# Patient Record
Sex: MALE | Race: WHITE | Employment: FULL TIME | URBAN - NONMETROPOLITAN AREA
[De-identification: names, ages, dates, MRNs, and addresses within clinical notes are randomized per-mention and may not be internally consistent; named-entity substitution may affect disease eponyms.]

---

## 2017-10-09 ENCOUNTER — TELEPHONE (OUTPATIENT)
Dept: FAMILY MEDICINE CLINIC | Age: 42
End: 2017-10-09

## 2017-10-09 NOTE — TELEPHONE ENCOUNTER
Patient returned scheduling call, declined 10/10/17 with Dr Charlotte Corona due work, appointment scheduled for 10/19/17 with Marlon Almeida, he declined sooner morning appointments due his work schedule.

## 2017-10-09 NOTE — TELEPHONE ENCOUNTER
Attempted to contact the pt to schedule the appt Dr Radha Tiwari mentioned & got VM. A msg was left notifying the pt to return our call if he is still needing the appt.

## 2017-10-09 NOTE — TELEPHONE ENCOUNTER
Allison Wallace is having lower abdominal pain, left testicle pain and low back pain. He would be a new patient and next new patient opening is not until November.

## 2017-10-18 ENCOUNTER — TELEPHONE (OUTPATIENT)
Dept: FAMILY MEDICINE CLINIC | Age: 42
End: 2017-10-18

## 2018-12-07 ENCOUNTER — HOSPITAL ENCOUNTER (EMERGENCY)
Age: 43
Discharge: HOME | End: 2018-12-07
Payer: COMMERCIAL

## 2018-12-07 VITALS
DIASTOLIC BLOOD PRESSURE: 64 MMHG | HEIGHT: 66 IN | HEART RATE: 77 BPM | SYSTOLIC BLOOD PRESSURE: 111 MMHG | BODY MASS INDEX: 25.31 KG/M2 | RESPIRATION RATE: 18 BRPM | WEIGHT: 157.5 LBS | TEMPERATURE: 98.1 F | OXYGEN SATURATION: 98 %

## 2018-12-07 DIAGNOSIS — K08.89 PAIN, DENTAL: ICD-10-CM

## 2018-12-07 DIAGNOSIS — K02.9 DENTAL DECAY: Primary | ICD-10-CM

## 2018-12-07 PROCEDURE — 99202 OFFICE O/P NEW SF 15 MIN: CPT

## 2018-12-07 PROCEDURE — 99203 OFFICE O/P NEW LOW 30 MIN: CPT | Performed by: NURSE PRACTITIONER

## 2018-12-07 RX ORDER — AMOXICILLIN 500 MG/1
500 CAPSULE ORAL 3 TIMES DAILY
Qty: 30 CAPSULE | Refills: 0 | Status: SHIPPED | OUTPATIENT
Start: 2018-12-07 | End: 2018-12-17

## 2018-12-07 RX ORDER — IBUPROFEN 800 MG/1
800 TABLET ORAL EVERY 8 HOURS PRN
Qty: 30 TABLET | Refills: 0 | Status: SHIPPED | OUTPATIENT
Start: 2018-12-07 | End: 2020-02-07

## 2018-12-07 RX ORDER — IBUPROFEN 200 MG
200 TABLET ORAL EVERY 6 HOURS PRN
COMMUNITY
End: 2018-12-07 | Stop reason: ALTCHOICE

## 2018-12-07 ASSESSMENT — PAIN DESCRIPTION - LOCATION: LOCATION: TEETH

## 2018-12-07 ASSESSMENT — PAIN SCALES - GENERAL: PAINLEVEL_OUTOF10: 5

## 2018-12-07 ASSESSMENT — PAIN DESCRIPTION - ORIENTATION: ORIENTATION: RIGHT;UPPER

## 2018-12-07 ASSESSMENT — PAIN DESCRIPTION - PAIN TYPE: TYPE: ACUTE PAIN

## 2018-12-07 ASSESSMENT — PAIN DESCRIPTION - FREQUENCY: FREQUENCY: CONTINUOUS

## 2018-12-07 ASSESSMENT — PAIN DESCRIPTION - DESCRIPTORS: DESCRIPTORS: ACHING;THROBBING

## 2018-12-13 ASSESSMENT — ENCOUNTER SYMPTOMS
CHEST TIGHTNESS: 0
WHEEZING: 0
SORE THROAT: 0
RHINORRHEA: 0
SINUS PRESSURE: 0
STRIDOR: 0
COUGH: 0
SHORTNESS OF BREATH: 0
VOICE CHANGE: 0
TROUBLE SWALLOWING: 0
FACIAL SWELLING: 0

## 2019-11-26 ENCOUNTER — HOSPITAL ENCOUNTER (EMERGENCY)
Age: 44
Discharge: HOME | End: 2019-11-26
Payer: COMMERCIAL

## 2019-11-26 ENCOUNTER — TELEPHONE (OUTPATIENT)
Dept: FAMILY MEDICINE CLINIC | Age: 44
End: 2019-11-26

## 2019-11-26 VITALS
DIASTOLIC BLOOD PRESSURE: 81 MMHG | RESPIRATION RATE: 14 BRPM | HEART RATE: 82 BPM | SYSTOLIC BLOOD PRESSURE: 120 MMHG | BODY MASS INDEX: 25.39 KG/M2 | OXYGEN SATURATION: 96 % | HEIGHT: 66 IN | WEIGHT: 158 LBS | TEMPERATURE: 98.2 F

## 2019-11-26 DIAGNOSIS — B02.39: Primary | ICD-10-CM

## 2019-11-26 PROCEDURE — 99202 OFFICE O/P NEW SF 15 MIN: CPT

## 2019-11-26 PROCEDURE — 99203 OFFICE O/P NEW LOW 30 MIN: CPT | Performed by: NURSE PRACTITIONER

## 2019-11-26 RX ORDER — IBUPROFEN 800 MG/1
800 TABLET ORAL EVERY 6 HOURS PRN
Qty: 30 TABLET | Refills: 1 | Status: SHIPPED | OUTPATIENT
Start: 2019-11-26 | End: 2020-01-10 | Stop reason: ALTCHOICE

## 2019-11-26 RX ORDER — ACYCLOVIR 800 MG/1
800 TABLET ORAL
Qty: 35 TABLET | Refills: 0 | Status: SHIPPED | OUTPATIENT
Start: 2019-11-26 | End: 2019-12-03

## 2019-11-26 ASSESSMENT — ENCOUNTER SYMPTOMS
RHINORRHEA: 0
FACIAL SWELLING: 0
ALLERGIC/IMMUNOLOGIC NEGATIVE: 1
EYE DISCHARGE: 0
TROUBLE SWALLOWING: 0
COUGH: 0
VOICE CHANGE: 0
VOMITING: 0
SORE THROAT: 0
SHORTNESS OF BREATH: 0
EYE PAIN: 0
PHOTOPHOBIA: 0
EYE ITCHING: 1
EYE REDNESS: 0
COLOR CHANGE: 0
NAUSEA: 0
ABDOMINAL PAIN: 0

## 2019-11-26 ASSESSMENT — PAIN DESCRIPTION - FREQUENCY: FREQUENCY: CONTINUOUS

## 2019-11-26 ASSESSMENT — PAIN DESCRIPTION - DESCRIPTORS: DESCRIPTORS: BURNING;ITCHING

## 2019-11-26 ASSESSMENT — PAIN DESCRIPTION - ORIENTATION: ORIENTATION: LEFT

## 2019-11-26 ASSESSMENT — PAIN DESCRIPTION - LOCATION: LOCATION: FACE;HEAD

## 2019-11-26 ASSESSMENT — PAIN DESCRIPTION - PAIN TYPE: TYPE: ACUTE PAIN

## 2019-11-26 ASSESSMENT — PAIN SCALES - GENERAL: PAINLEVEL_OUTOF10: 3

## 2020-01-09 ENCOUNTER — TELEPHONE (OUTPATIENT)
Dept: FAMILY MEDICINE CLINIC | Age: 45
End: 2020-01-09

## 2020-01-10 ENCOUNTER — OFFICE VISIT (OUTPATIENT)
Dept: FAMILY MEDICINE CLINIC | Age: 45
End: 2020-01-10
Payer: COMMERCIAL

## 2020-01-10 ENCOUNTER — NURSE ONLY (OUTPATIENT)
Dept: LAB | Age: 45
End: 2020-01-10

## 2020-01-10 VITALS
RESPIRATION RATE: 10 BRPM | BODY MASS INDEX: 26.99 KG/M2 | SYSTOLIC BLOOD PRESSURE: 128 MMHG | HEART RATE: 88 BPM | WEIGHT: 162 LBS | DIASTOLIC BLOOD PRESSURE: 74 MMHG | TEMPERATURE: 97.7 F | HEIGHT: 65 IN

## 2020-01-10 PROBLEM — R10.13 DYSPEPSIA: Status: ACTIVE | Noted: 2020-01-10

## 2020-01-10 PROBLEM — R19.4 BOWEL HABIT CHANGES: Status: ACTIVE | Noted: 2020-01-10

## 2020-01-10 LAB
ALBUMIN SERPL-MCNC: 4.7 G/DL (ref 3.5–5.1)
ALP BLD-CCNC: 51 U/L (ref 38–126)
ALT SERPL-CCNC: 18 U/L (ref 11–66)
AMYLASE: 73 U/L (ref 20–104)
ANION GAP SERPL CALCULATED.3IONS-SCNC: 13 MEQ/L (ref 8–16)
AST SERPL-CCNC: 17 U/L (ref 5–40)
BASOPHILS # BLD: 0.4 %
BASOPHILS ABSOLUTE: 0 THOU/MM3 (ref 0–0.1)
BILIRUB SERPL-MCNC: 0.4 MG/DL (ref 0.3–1.2)
BUN BLDV-MCNC: 12 MG/DL (ref 7–22)
CALCIUM SERPL-MCNC: 9.8 MG/DL (ref 8.5–10.5)
CHLORIDE BLD-SCNC: 102 MEQ/L (ref 98–111)
CHOLESTEROL, TOTAL: 97 MG/DL (ref 100–199)
CO2: 24 MEQ/L (ref 23–33)
CREAT SERPL-MCNC: 0.9 MG/DL (ref 0.4–1.2)
EOSINOPHIL # BLD: 1.1 %
EOSINOPHILS ABSOLUTE: 0.1 THOU/MM3 (ref 0–0.4)
ERYTHROCYTE [DISTWIDTH] IN BLOOD BY AUTOMATED COUNT: 12.6 % (ref 11.5–14.5)
ERYTHROCYTE [DISTWIDTH] IN BLOOD BY AUTOMATED COUNT: 43.9 FL (ref 35–45)
GFR SERPL CREATININE-BSD FRML MDRD: > 90 ML/MIN/1.73M2
GLUCOSE BLD-MCNC: 97 MG/DL (ref 70–108)
HCT VFR BLD CALC: 46.2 % (ref 42–52)
HDLC SERPL-MCNC: 62 MG/DL
HEMOGLOBIN: 15.1 GM/DL (ref 14–18)
IMMATURE GRANS (ABS): 0.02 THOU/MM3 (ref 0–0.07)
IMMATURE GRANULOCYTES: 0.2 %
LDL CHOLESTEROL CALCULATED: 27 MG/DL
LIPASE: 40.6 U/L (ref 5.6–51.3)
LYMPHOCYTES # BLD: 26.6 %
LYMPHOCYTES ABSOLUTE: 2.3 THOU/MM3 (ref 1–4.8)
MCH RBC QN AUTO: 30.9 PG (ref 26–33)
MCHC RBC AUTO-ENTMCNC: 32.7 GM/DL (ref 32.2–35.5)
MCV RBC AUTO: 94.7 FL (ref 80–94)
MONOCYTES # BLD: 7.6 %
MONOCYTES ABSOLUTE: 0.6 THOU/MM3 (ref 0.4–1.3)
NUCLEATED RED BLOOD CELLS: 0 /100 WBC
PLATELET # BLD: 197 THOU/MM3 (ref 130–400)
PMV BLD AUTO: 11.5 FL (ref 9.4–12.4)
POTASSIUM SERPL-SCNC: 4.4 MEQ/L (ref 3.5–5.2)
RBC # BLD: 4.88 MILL/MM3 (ref 4.7–6.1)
SEG NEUTROPHILS: 64.1 %
SEGMENTED NEUTROPHILS ABSOLUTE COUNT: 5.4 THOU/MM3 (ref 1.8–7.7)
SODIUM BLD-SCNC: 139 MEQ/L (ref 135–145)
TOTAL PROTEIN: 7.1 G/DL (ref 6.1–8)
TRIGL SERPL-MCNC: 42 MG/DL (ref 0–199)
TSH SERPL DL<=0.05 MIU/L-ACNC: 1.2 UIU/ML (ref 0.4–4.2)
WBC # BLD: 8.5 THOU/MM3 (ref 4.8–10.8)

## 2020-01-10 PROCEDURE — 99204 OFFICE O/P NEW MOD 45 MIN: CPT | Performed by: FAMILY MEDICINE

## 2020-01-10 RX ORDER — VARENICLINE TARTRATE 1 MG/1
1 TABLET, FILM COATED ORAL 2 TIMES DAILY
Qty: 60 TABLET | Refills: 1 | Status: SHIPPED | OUTPATIENT
Start: 2020-01-10 | End: 2021-06-16

## 2020-01-10 RX ORDER — VARENICLINE TARTRATE 25 MG
KIT ORAL
Qty: 1 BOX | Refills: 0 | Status: SHIPPED | OUTPATIENT
Start: 2020-01-10 | End: 2021-06-16

## 2020-01-10 RX ORDER — OMEPRAZOLE 20 MG/1
20 CAPSULE, DELAYED RELEASE ORAL
Qty: 60 CAPSULE | Refills: 5 | Status: SHIPPED | OUTPATIENT
Start: 2020-01-10

## 2020-01-10 ASSESSMENT — PATIENT HEALTH QUESTIONNAIRE - PHQ9
1. LITTLE INTEREST OR PLEASURE IN DOING THINGS: 0
2. FEELING DOWN, DEPRESSED OR HOPELESS: 0
SUM OF ALL RESPONSES TO PHQ QUESTIONS 1-9: 0
SUM OF ALL RESPONSES TO PHQ9 QUESTIONS 1 & 2: 0
SUM OF ALL RESPONSES TO PHQ QUESTIONS 1-9: 0

## 2020-01-10 NOTE — PATIENT INSTRUCTIONS
eating habits. ? It's best to eat several small meals instead of two or three large meals. ? After you eat, wait 2 to 3 hours before you lie down. ? Chocolate, mint, and alcohol can make GERD worse. ? Spicy foods, foods that have a lot of acid (like tomatoes and oranges), and coffee can make GERD symptoms worse in some people. If your symptoms are worse after you eat a certain food, you may want to stop eating that food to see if your symptoms get better. · Do not smoke or chew tobacco. Smoking can make GERD worse. If you need help quitting, talk to your doctor about stop-smoking programs and medicines. These can increase your chances of quitting for good. · If you have GERD symptoms at night, raise the head of your bed 6 to 8 inches. You can do this by putting the frame on blocks or placing a foam wedge under the head of your mattress. (Adding extra pillows does not work.)  · Do not wear tight clothing around your middle. · Lose weight if you need to. Losing just 5 to 10 pounds can help. · Do not take anti-inflammatory medicines, such as aspirin, ibuprofen (Advil, Motrin), or naproxen (Aleve). These can irritate the stomach. If you need a pain medicine, try acetaminophen (Tylenol), which does not cause stomach upset. When should you call for help? Call your doctor now or seek immediate medical care if:    · You have new or worse belly pain.     · You are vomiting.    Watch closely for changes in your health, and be sure to contact your doctor if:    · You have new or worse symptoms of indigestion.     · You have trouble or pain swallowing.     · You are losing weight.     · You do not get better as expected. Where can you learn more? Go to https://[employer]ss.[employer]. org and sign in to your [employer] account. Enter U299 in the [employer] box to learn more about \"Indigestion (Dyspepsia or Heartburn): Care Instructions. \"     If you do not have an account, please click on the \"Sign Up and call your doctor if you are having problems. It's also a good idea to know your test results and keep a list of the medicines you take. How can you care for yourself at home? · Ask your family, friends, and coworkers for support. You have a better chance of quitting if you have help and support. · Join a support group, such as Nicotine Anonymous, for people who are trying to quit smoking. · Consider signing up for a smoking cessation program, such as the American Lung Association's Freedom from Smoking program.  · Get text messaging support. Go to the website at www.smokefree. gov to sign up for the [facility] program.  · Set a quit date. Pick your date carefully so that it is not right in the middle of a big deadline or stressful time. Once you quit, do not even take a puff. Get rid of all ashtrays and lighters after your last cigarette. Clean your house and your clothes so that they do not smell of smoke. · Learn how to be a nonsmoker. Think about ways you can avoid those things that make you reach for a cigarette. ? Avoid situations that put you at greatest risk for smoking. For some people, it is hard to have a drink with friends without smoking. For others, they might skip a coffee break with coworkers who smoke. ? Change your daily routine. Take a different route to work or eat a meal in a different place. · Cut down on stress. Calm yourself or release tension by doing an activity you enjoy, such as reading a book, taking a hot bath, or gardening. · Talk to your doctor or pharmacist about nicotine replacement therapy, which replaces the nicotine in your body. You still get nicotine but you do not use tobacco. Nicotine replacement products help you slowly reduce the amount of nicotine you need. These products come in several forms, many of them available over-the-counter:  ? Nicotine patches  ? Nicotine gum and lozenges  ?  Nicotine inhaler  · Ask your doctor about bupropion (Wellbutrin) or

## 2020-01-13 ENCOUNTER — TELEPHONE (OUTPATIENT)
Dept: FAMILY MEDICINE CLINIC | Age: 45
End: 2020-01-13

## 2020-01-13 NOTE — TELEPHONE ENCOUNTER
----- Message from Mare Samuels DO sent at 1/12/2020  9:58 AM EST -----  Please let pt know that blood work looked great. Stool testing pending yet, will call with results once available  Lipids are excellent  No diabetes. Thyroid fxn wNL  Let me know if questions, thanks!

## 2020-01-17 LAB — HELICOBACTER PYLORI ANTIGEN, STOOL: NORMAL

## 2020-01-20 ENCOUNTER — TELEPHONE (OUTPATIENT)
Dept: FAMILY MEDICINE CLINIC | Age: 45
End: 2020-01-20

## 2020-01-20 NOTE — TELEPHONE ENCOUNTER
----- Message from Ronnie Mays DO sent at 1/19/2020  9:37 AM EST -----  Please let pt know that stool test was unable to be run  Recommend we redo this. Let me know if questions, thanks!

## 2020-01-29 LAB — HELICOBACTER PYLORI ANTIGEN, STOOL: NEGATIVE

## 2020-01-30 ENCOUNTER — TELEPHONE (OUTPATIENT)
Dept: FAMILY MEDICINE CLINIC | Age: 45
End: 2020-01-30

## 2020-01-30 NOTE — TELEPHONE ENCOUNTER
Sheron Waldrop DO  P [facility]             Please let pt know that stool study neg for stomach infection. Also, there are 2 charts with the same pt, they need merged, please get with medical records for this, thanks! Associated Results     Result Notes for H. Pylori Antigen, Stool     Notes recorded by Leticia Salazar CMA ([address] [place] [address]) on 1/30/2020 at 8:45 AM EST  SEE TEL ENC 01.30.2020  ------    Notes recorded by Sheron Waldrop DO on 1/29/2020 at 6:36 PM EST  Please let pt know that stool study neg for stomach infection. Also, there are 2 charts with the same pt, they need merged, please get with medical records for this, thanks! H. Pylori Antigen, Stool   Order: [id number]   Status:  Edited Result - FINAL   Visible to patient:  No (Not Released)    Ref Range & Units 3d ago   HELICOBACTER PYLORI ANTIGEN, STOOL NEGATIVE NEGATIVE    Comment:                                                                             Test performed at [address] [place] [address].                     [address] [facility] [address], [address] [facility] [address] Number [id number]     CAP Accreditation Number [id number]       ----------------------------------------------------------------------   Pathology [facility] [facility], [address] [facility] [id number]   CAP Accreditation No. [id number]   : Vern De Santiago M.D.     Resulting Agency  [facility]      Narrative   Performed by: [facility]   Ordering Provider: Del Joya      Specimen Collected: 01/27/20 07:30 Last Resulted: 01/29/20 15:34         Lab Flowsheet       Order Details       View Encounter       Lab and Collection Details       Routing       Result History

## 2020-01-30 NOTE — TELEPHONE ENCOUNTER
Medical records states they are going to try and merge the records together today. Will let us know if they have any issues.

## 2020-02-07 ENCOUNTER — TELEPHONE (OUTPATIENT)
Dept: FAMILY MEDICINE CLINIC | Age: 45
End: 2020-02-07

## 2020-02-07 ENCOUNTER — OFFICE VISIT (OUTPATIENT)
Dept: FAMILY MEDICINE CLINIC | Age: 45
End: 2020-02-07
Payer: COMMERCIAL

## 2020-02-07 VITALS
BODY MASS INDEX: 27.49 KG/M2 | TEMPERATURE: 97.5 F | RESPIRATION RATE: 12 BRPM | HEART RATE: 80 BPM | HEIGHT: 65 IN | SYSTOLIC BLOOD PRESSURE: 108 MMHG | DIASTOLIC BLOOD PRESSURE: 62 MMHG | WEIGHT: 165 LBS

## 2020-02-07 PROBLEM — R19.4 BOWEL HABIT CHANGES: Status: RESOLVED | Noted: 2020-01-10 | Resolved: 2020-02-07

## 2020-02-07 PROCEDURE — 99214 OFFICE O/P EST MOD 30 MIN: CPT | Performed by: FAMILY MEDICINE

## 2020-02-07 NOTE — PATIENT INSTRUCTIONS
Patient Education        Indigestion (Dyspepsia or Heartburn): Care Instructions  Your Care Instructions  Sometimes it can be hard to pinpoint the cause of indigestion. (It is also called dyspepsia or heartburn.) Most cases of an upset stomach with bloating, burning, burping, and nausea are minor and go away within several hours. Home treatment and over-the-counter medicine often are able to control symptoms. But if you take medicine to relieve your indigestion without making diet and lifestyle changes, your symptoms are likely to return again and again. If you get indigestion often, it may be a sign of a more serious medical problem. Be sure to follow up with your doctor, who may want to do tests to be sure of the cause of your indigestion. Follow-up care is a key part of your treatment and safety. Be sure to make and go to all appointments, and call your doctor if you are having problems. It's also a good idea to know your test results and keep a list of the medicines you take. How can you care for yourself at home? · Your doctor may recommend over-the-counter medicine. For mild or occasional indigestion, antacids such as Gaviscon, Mylanta, Maalox, or Tums, may help. Be safe with medicines. Be careful when you take over-the-counter antacid medicines. Many of these medicines have aspirin in them. Read the label to make sure that you are not taking more than the recommended dose. Too much aspirin can be harmful. · Your doctor also may recommend over-the-counter acid reducers, such as Pepcid AC, Tagamet HB, Zantac 75, or Prilosec. Read and follow all instructions on the label. If you use these medicines often, talk with your doctor. · Change your eating habits. ? It's best to eat several small meals instead of two or three large meals. ? After you eat, wait 2 to 3 hours before you lie down. ? Chocolate, mint, and alcohol can make GERD worse. ?  Spicy foods, foods that have a lot of acid (like tomatoes and professional. [address] any warranty or liability for your use of this information. Ambulatory

## 2020-02-07 NOTE — PROGRESS NOTES
Chief Complaint   Patient presents with    Follow-up     issues as noted below       History obtained from the patient. SUBJECTIVE:  Jonathan Sharpe is a 38 y.o. male that presents today for     -Epigastric pain LAST VISIT:  Going on the last year  Mild, but constant  occ heartburn, depending on what he eats. No dyshagia  No melena or hematochezia  No wt loss  Not tried anything OTC  Typically does not take NSAIDS  Does state bowel movement shape has changed some the last year  MGF with colon cancer, ~age 55  Denies excessive ETOH use    UPDATE TODAY:   Doing much better  On omeprazole bid  Saw [facility]  Had EGD/colo Monday, pt states had some gastritis and a small colon polyp  Is to have colo in 5 years  No f/u with GI planned  Has made diet changes  No dysphagia  Has cut back on ETOH use. -ETOH:  Admitted to some more ETOH use than he told me last visit, but has cut back sig  1 to 2 drinks per day if that  Was doing a few 20 oz beer per day prior      -Smoking LAST VISIT:  PPD x 30 years  Tried various times to quit  Nothing stuck  Has tried wellbutrin and cold turkey    UPDATE TODAY:   On chantix x 2 wks  Down to 1/2 PPD  Doing well  No side effects    Age/Gender Health Maintenance    Lipid -   Lab Results   Component Value Date    CHOL 97 (L) 01/10/2020     Lab Results   Component Value Date    TRIG 42 01/10/2020     Lab Results   Component Value Date    HDL 62 01/10/2020     Lab Results   Component Value Date    LDLCALC 27 01/10/2020     No results found for: LABVLDL, VLDL  No results found for: CHOLHDLRATIO    DM Screen -   Lab Results   Component Value Date    GLUCOSE 97 01/10/2020       Colon Cancer Screening - Colon polyp (Records pending) with [facility], FEB 2020, repeat 5 years.    Lung Cancer Screening (Age 54 to [de-identified] with 30 pack year hx, current smoker or quit within past 13 years) - age 53    Tetanus - discuss next visit  Influenza Vaccine - declines JAN 2020  Pneumonia Vaccine - declines FEB 2020  Zoster - age 47     PSA testing discussion - age 53  AAA Screening - age 71    Falls screening - n/a      Current Outpatient Medications   Medication Sig Dispense Refill    varenicline (CHANTIX CONTINUING MONTH PAK) 1 MG tablet Take 1 tablet by mouth 2 times daily 60 tablet 1    varenicline (CHANTIX STARTING MONTH PAK) 0.5 MG X 11 & 1 MG X 42 tablet Take by mouth as directed on box 1 box 0    omeprazole (PRILOSEC) 20 MG delayed release capsule Take 1 capsule by mouth 2 times daily (before meals) 60 capsule 5     No current facility-administered medications for this visit. No orders of the defined types were placed in this encounter. All medications reviewed and reconciled, including OTC and herbal medications. Updated list given to patient. Patient Active Problem List    Diagnosis Date Noted    Dyspepsia 01/10/2020    Family history of colon cancer, Maternal grandfather at ~ age 54     History of shingles     Nicotine dependence        Past Medical History:   Diagnosis Date    Family history of colon cancer, Maternal grandfather at ~ age 54     Hernia, inguinal     History of shingles     Kidney stone     Nicotine dependence        Past Surgical History:   Procedure Laterality Date    ADENOIDECTOMY      HERNIA REPAIR      KIDNEY STONE SURGERY      LITHOTRIPSY      MYRINGOTOMY AND TYMPANOSTOMY TUBE PLACEMENT      VASECTOMY N/A 09/28/2007       No Known Allergies      Social History     Tobacco Use    Smoking status: Current Every Day Smoker     Packs/day: 0.50     Years: 30.00     Pack years: 15.00     Types: Cigarettes     Start date: 1/10/1990    Smokeless tobacco: Never Used    Tobacco comment: cutting down with Chantix   Substance Use Topics    Alcohol use:  Yes     Alcohol/week: 21.0 standard drinks     Types: 21 Cans of beer per week     Comment: 2-3 beers /day         Family History   Problem Relation Age of Onset    Colon Cancer Maternal Grandfather 54    Prostate Cancer Paternal Uncle 61    Prostate Cancer Paternal Uncle 61    Heart Attack Father          I have reviewed the patient's past medical history, past surgical history, allergies, medications, social and family history and I have made updates where appropriate. Review of Systems  Positive responses are highlighted in bold    Constitutional:  Fever, Chills, Night Sweats, Fatigue, Unexpected changes in weight  HENT:  Ear pain, Tinnitus, Nosebleeds, Trouble swallowing, Hearing loss, Sore throat  Cardiovascular:  Chest Pain, Palpitations, Orthopnea, Paroxysmal Nocturnal Dyspnea  Respiratory:  Cough, Wheezing, Shortness of breath, Chest tightness, Apnea  Gastrointestinal:  Nausea, Vomiting, Diarrhea, Constipation, Heartburn, Blood in stool  Genitourinary:  Difficulty or painful urination, Flank pain, Change in frequency, Urgency  Skin:  Color change, Rash, Itching, Wound  Musculoskeletal:  Joint pain, Back pain, Gait problems, Joint swelling, Myalgias  Neurological:  Dizziness, Headaches, Presyncope, Numbness, Seizures, Tremors  Endocrine:  Heat Intolerance, Cold Intolerance, Polydipsia, Polyphagia, Polyuria      PHYSICAL EXAM:  Vitals:    02/07/20 0822   BP: 108/62   Pulse: 80   Resp: 12   Temp: 97.5 °F (36.4 °C)   TempSrc: Oral   Weight: 165 lb (74.8 kg)   Height: 5' 5\" (1.651 m)     Body mass index is 27.46 kg/m².   Pain Score:   0 - No pain    VS Reviewed  General Appearance: A&O x 3, No acute distress,well developed and well- nourished  Eyes: pupils equal, round, and reactive to light, extraocular eye movements intact, conjunctivae and eye lids without erythema  ENT: external ear and ear canal clear bilaterally, TMs intact and regular, nose without deformity, nasal mucosa and turbinates normal without polyps, oropharynx normal, dentition is normal for age  Neck: supple and non-tender without mass, no thyromegaly or thyroid nodules, no cervical lymphadenopathy  Pulmonary/Chest: clear to auscultation bilaterally- no wheezes, rales or rhonchi, normal air movement, no respiratory distress or retractions  Cardiovascular: S1 and S2 auscultated w/ RRR. No murmurs, rubs, clicks, or gallops, distal pulses intact. Abdomen: soft, non-tender, non-distended, bowel sounds physiologic,  no rebound or guarding, no masses or hernias noted. Liver and spleen without enlargement. Extremities: no cyanosis, clubbing or edema of the lower extremities. +2 PT/DP bilaterally. Musculoskeletal: No joint swelling or gross deformity   Skin: warm and dry, no rash or erythema      Orders Only on 01/27/2020   Component Date Value Ref Range Status    HELICOBACTER PYLORI ANTIGEN, STOOL 01/27/2020 NEGATIVE  NEGATIVE Final    Comment:                                                                             Test performed at [address] [place] [address].                    [address] [place] [address]                     CLIA Number [id number]     CAP Accreditation Number [id number]       ----------------------------------------------------------------------  Pathology [address] [place] [address], [address] [place] [address]  CLIA No. [id number]   CAP Accreditation No. [id number]  : Arely Adams. Shweta Mak M.D.      Nurse Only on 01/10/2020   Component Date Value Ref Range Status    HELICOBACTER PYLORI ANTIGEN, STOOL 01/10/2020 SEE BELOW   Final    Comment: Specimen Description           FECES  Direct Exam                 REJECTED  Direct Exam                SEE BELOW  SPECIMEN BEYOND STABILITY RANGE  Direct Exam                 [facility] [address] [facility], [address] [place] [address] [phone]  Report Status              SEE BELOW  FINAL 01/17/2020      Lipase 01/10/2020 40.6  5.6 - 51.3 U/L Final    Performed at [address] [place] [address], [facility]    Amylase 01/10/2020 73  20 - 104 U/L Final    Performed at [address] [place] [address], [facility]    TSH 01/10/2020 1.200  0.400 - 4.20 uIU/mL Final    Performed at [facility], [address]    Cholesterol, Total 01/10/2020 97* 100 - 199 mg/dL Final    Comment:      <200          Desirable       200 - 239     Borderline High       >239          High      Triglycerides 01/10/2020 42  0 - 199 mg/dL Final    Comment:      <150          Desirable       150 - 199     Borderline High       200 - 499     High       >449          Very High       Ranges are based upon NCEP/ATP III guidelines.  HDL 01/10/2020 62  mg/dL Final    Comment:      Refer to General Chemistry for CHOL and TRIG results. HDL CLASSIFICATIONS FOR PATIENTS > 21YEARS OLD. <40               Undesirable (Major Risk Factor)       >60               Protective (Negative Risk Factor)      LDL Calculated 01/10/2020 27  mg/dL Final    Comment:      Refer to General Chemistry for CHOL and TRIG results.        LDL CLASSIFICATIONS FOR PATIENTS >21YEARS OLD:          ** Determination Invalid if TRIG >400 **       <100          Optimal       100 - 129     Near or Above Optimal       130 - 159     Borderline High       160 - 189     High Risk       >189          Very High Risk  Performed at [facility], [address]      Glucose 01/10/2020 97  70 - 108 mg/dL Final    CREATININE 01/10/2020 0.9  0.4 - 1.2 mg/dL Final    BUN 01/10/2020 12  7 - 22 mg/dL Final    Sodium 01/10/2020 139  135 - 145 meq/L Final    Potassium 01/10/2020 4.4  3.5 - 5.2 meq/L Final    Chloride 01/10/2020 102  98 - 111 meq/L Final    CO2 01/10/2020 24  23 - 33 meq/L Final    Calcium 01/10/2020 9.8  8.5 - 10.5 mg/dL Final    AST 01/10/2020 17  5 - 40 U/L Final    Alkaline Phosphatase 01/10/2020 51  38 - 126 U/L Final    Total Protein 01/10/2020 7.1  6.1 - 8.0 g/dL Final    Alb 01/10/2020 4.7  3.5 - 5.1 g/dL Final    Total Bilirubin 01/10/2020 0.4  0.3 - 1.2 mg/dL Final    ALT 01/10/2020 18  11 - 66 U/L Final Severely decreased GFR          15 - 29   5   Kidney failure                  <15 (or dialysis)  Estimated GFR calculated using abbreviated MDRD formula as  recommended by [employer]. Calculation based  upon serum creatinine and adjusted for age, gender & race. Winnie. Internal Med., Vol. 139 (2) pg 137-147. Performed at [address] [place] [address].  1. Dyspepsia    Doing much better  con't PPI omeprazole for 3 months  Can stop after that if doing well    If sxs return, call office and will resume once daily PPI  Otherwise, if doing well, remain off  con't with diet changes  Avoid NSAIDS  Limit ETOH use  F/u GI prn    2. Chronic gastritis without bleeding, unspecified gastritis type    As per # 1    3. Bowel habit changes    Neg scopping per pt  Records pending    4. Polyp of colon, unspecified part of colon, unspecified type    Records pending  Pt states needs 5 year f/u colo    5. Alcohol problem drinking    Has cut back  Discussed healthy and appropriate ETOH use. 6. Cigarette nicotine dependence without complication    On chantix  So far, so good  If needs to extend chantix past 12 wks, he will call      DISPOSITION    Return in about 1 year (around 2/7/2021) for routine well visit, sooner as needed. [employer] released without restrictions. PATIENT COUNSELING    Willard [employer] received counseling on the following healthy behaviors: nutrition, exercise, medication adherence and tobacco cessation    Patient given educational materials on: See Attached    I have instructed [employer] to complete a self tracking handout on Smoking and instructed them to bring it with them to his next appointment. Barriers to learning and self management: none    Discussed use, benefit, and side effects of prescribed medications. Barriers to medication compliance addressed. All patient questions answered. Pt voiced understanding.        Electronically

## 2021-06-15 NOTE — PROGRESS NOTES
Medications    buPROPion (WELLBUTRIN SR) 150 MG extended release tablet     Sig: Take 1 tablet by mouth daily for 3 days, THEN 1 tablet 2 times daily. X 3 months. .     Dispense:  177 tablet     Refill:  0         All medications reviewed and reconciled, including OTC and herbal medications. Updated list given to patient. Patient Active Problem List    Diagnosis Date Noted    Dyspepsia 01/10/2020    Family history of colon cancer, Maternal grandfather at ~ age 54     History of shingles     Nicotine dependence        Past Medical History:   Diagnosis Date    Family history of colon cancer, Maternal grandfather at ~ age 54     Hernia, inguinal     History of shingles     Kidney stone     Nicotine dependence        Past Surgical History:   Procedure Laterality Date    ADENOIDECTOMY      HERNIA REPAIR      KIDNEY STONE SURGERY      LITHOTRIPSY      MYRINGOTOMY AND TYMPANOSTOMY TUBE PLACEMENT      VASECTOMY N/A 09/28/2007       No Known Allergies      Social History     Tobacco Use    Smoking status: Current Every Day Smoker     Packs/day: 0.50     Years: 30.00     Pack years: 15.00     Types: Cigarettes     Start date: 1/10/1990    Smokeless tobacco: Never Used   Substance Use Topics    Alcohol use: Yes     Alcohol/week: 21.0 standard drinks     Types: 21 Cans of beer per week     Comment: 2-3 beers /day         Family History   Problem Relation Age of Onset    Colon Cancer Maternal Grandfather 54    Prostate Cancer Paternal Uncle 61    Prostate Cancer Paternal Uncle 61    Heart Attack Father          I have reviewed the patient's past medical history, past surgical history, allergies, medications, social and family history and I have made updates where appropriate.       Review of Systems  Positive responses are highlighted in bold    Constitutional:  Fever, Chills, Night Sweats, Fatigue, Unexpected changes in weight  Eyes:  Eye discharge, Eye pain, Eye redness, Visual disturbances   HENT: Ear pain, Tinnitus, Nosebleeds, Trouble swallowing, Hearing loss, Sore throat  Cardiovascular:  Chest Pain, Palpitations, Orthopnea, Paroxysmal Nocturnal Dyspnea  Respiratory:  Cough, Wheezing, Shortness of breath, Chest tightness, Apnea  Gastrointestinal:  Nausea, Vomiting, Diarrhea, Constipation, Heartburn, Blood in stool  Genitourinary:  Difficulty or painful urination, Flank pain, Change in frequency, Urgency  Skin:  Color change, Rash, Itching, Wound  Psychiatric:  Hallucinations, Anxiety, Depression, Suicidal ideation  Hematological:  Enlarged glands, Easy bleeding, Easily bruising  Musculoskeletal:  Joint pain, Back pain, Gait problems, Joint swelling, Myalgias  Neurological:  Dizziness, Headaches, Presyncope, Numbness, Seizures, Tremors  Allergy:  Environmental allergies, Food allergies  Endocrine:  Heat Intolerance, Cold Intolerance, Polydipsia, Polyphagia, Polyuria      PHYSICAL EXAM:  Vitals:    06/16/21 1017   BP: 106/60   Pulse: 74   Resp: 10   Temp: 98.4 °F (36.9 °C)   TempSrc: Oral   SpO2: 99%   Weight: 135 lb (61.2 kg)   Height: 5' 4\" (1.626 m)     Body mass index is 23.17 kg/m². Pain Score:   0 - No pain    VS Reviewed  General Appearance: A&O x 3, No acute distress,well developed and well- nourished  Head: normocephalic and atraumatic  Eyes: pupils equal, round, and reactive to light, extraocular eye movements intact, conjunctivae and eye lids without erythema  ENT: external ear and ear canal clear bilaterally, TMs intact and regular, nose without deformity, nasal mucosa and turbinates normal without polyps, oropharynx normal, dentition is normal for age  Neck: supple and non-tender without mass, no thyromegaly or thyroid nodules, no cervical lymphadenopathy  Pulmonary/Chest: clear to auscultation bilaterally- no wheezes, rales or rhonchi, normal air movement, no respiratory distress or retractions  Cardiovascular: S1 and S2 auscultated w/ RRR.  No murmurs, rubs, clicks, or gallops, distal pulses intact. Abdomen: soft, non-tender, non-distended, bowel sounds physiologic,  no rebound or guarding, no masses or hernias noted. Liver and spleen without enlargement. Extremities: no cyanosis, clubbing or edema of the lower extremities. +2 PT/DP bilaterally. Musculoskeletal: No joint swelling or gross deformity   Neuro:  Alert, 5/5 strength globally and symmetrically, 2+ patellar reflexes bilaterally  Psych: Affect appropriate. Mood normal. Thought process is normal without evidence of depression or psychosis. Good insight and appropriate interaction. Cognition and memory appear to be intact. Skin: warm and dry, no rash or erythema  Lymph:  No cervical, auricular or supraclavicular lymph nodes palpated      ASSESSMENT & PLAN  1. Visit for preventive health examination    Vaccines reviewed and update recommendations made  UTD on labs  F/u annually and prn    2. Cigarette nicotine dependence without complication    Trial wellbutrin  Call if wants to add  Patches/gum  F/u if unable to quit    - buPROPion (WELLBUTRIN SR) 150 MG extended release tablet; Take 1 tablet by mouth daily for 3 days, THEN 1 tablet 2 times daily. X 3 months. .  Dispense: 177 tablet; Refill: 0    3. Need for Tdap vaccination    - Tdap (age 6y and older) IM ([address])      [address]    Return in about 1 year (around 6/16/2022) for routine well visit, sooner as needed. Ammy Kelly released without restrictions. PATIENT COUNSELING    Ammy Kelly received counseling on the following healthy behaviors: nutrition, exercise, medication adherence and tobacco cessation    Patient given educational materials on: See Attached    I have instructed Ammy Kelly to complete a self tracking handout on Smoking and instructed them to bring it with them to his next appointment. Barriers to learning and self management: none    Discussed use, benefit, and side effects of prescribed medications. Barriers to medication compliance addressed.   All patient questions answered. Pt voiced understanding.        Electronically signed by Evelyn Zavala DO on 6/16/2021 at 10:40 AM

## 2021-06-16 ENCOUNTER — OFFICE VISIT (OUTPATIENT)
Dept: FAMILY MEDICINE CLINIC | Age: 46
End: 2021-06-16
Payer: COMMERCIAL

## 2021-06-16 VITALS
SYSTOLIC BLOOD PRESSURE: 106 MMHG | DIASTOLIC BLOOD PRESSURE: 60 MMHG | OXYGEN SATURATION: 99 % | HEART RATE: 74 BPM | BODY MASS INDEX: 23.05 KG/M2 | RESPIRATION RATE: 10 BRPM | HEIGHT: 64 IN | WEIGHT: 135 LBS | TEMPERATURE: 98.4 F

## 2021-06-16 DIAGNOSIS — F17.210 CIGARETTE NICOTINE DEPENDENCE WITHOUT COMPLICATION: ICD-10-CM

## 2021-06-16 DIAGNOSIS — Z23 NEED FOR TDAP VACCINATION: ICD-10-CM

## 2021-06-16 DIAGNOSIS — Z00.00 VISIT FOR PREVENTIVE HEALTH EXAMINATION: Primary | ICD-10-CM

## 2021-06-16 PROCEDURE — 90715 TDAP VACCINE 7 YRS/> IM: CPT | Performed by: FAMILY MEDICINE

## 2021-06-16 PROCEDURE — 99396 PREV VISIT EST AGE 40-64: CPT | Performed by: FAMILY MEDICINE

## 2021-06-16 PROCEDURE — 90471 IMMUNIZATION ADMIN: CPT | Performed by: FAMILY MEDICINE

## 2021-06-16 RX ORDER — BUPROPION HYDROCHLORIDE 150 MG/1
TABLET, EXTENDED RELEASE ORAL
Qty: 177 TABLET | Refills: 0 | Status: SHIPPED | OUTPATIENT
Start: 2021-06-16 | End: 2021-09-14

## 2021-06-16 ASSESSMENT — PATIENT HEALTH QUESTIONNAIRE - PHQ9
1. LITTLE INTEREST OR PLEASURE IN DOING THINGS: 0
SUM OF ALL RESPONSES TO PHQ QUESTIONS 1-9: 0
SUM OF ALL RESPONSES TO PHQ QUESTIONS 1-9: 0
SUM OF ALL RESPONSES TO PHQ9 QUESTIONS 1 & 2: 0
2. FEELING DOWN, DEPRESSED OR HOPELESS: 0
SUM OF ALL RESPONSES TO PHQ QUESTIONS 1-9: 0

## 2021-06-16 NOTE — PROGRESS NOTES
Immunization(s) given during visit:    Immunizations Administered     Name Date Dose Route    Tdap (Boostrix, Adacel) 6/16/2021 0.5 mL Intramuscular    Site: Deltoid- Right    Lot: [id number]    NDC: 66855-203-85          Most recent Vaccine Information Sheet dated 04/01/20 given to pt

## 2021-06-16 NOTE — PATIENT INSTRUCTIONS
sad or hopeless, talk with your doctor. Treatment can help. · Talk to your doctor about whether you have any risk factors for sexually transmitted infections (STIs). You can help prevent STIs if you wait to have sex with a new partner (or partners) until you've each been tested for STIs. It also helps if you use condoms (male or female condoms) and if you limit your sex partners to one person who only has sex with you. Vaccines are available for some STIs, such as HPV. · Use birth control if it's important to you to prevent pregnancy. Talk with your doctor about the choices available and what might be best for you. · If you think you may have a problem with alcohol or drug use, talk to your doctor. This includes prescription medicines (such as amphetamines and opioids) and illegal drugs (such as cocaine and methamphetamine). Your doctor can help you figure out what type of treatment is best for you. · Protect your skin from too much sun. When you're outdoors from 10 a.m. to 4 p.m., stay in the shade or cover up with clothing and a hat with a wide brim. Wear sunglasses that block UV rays. Even when it's cloudy, put broad-spectrum sunscreen (SPF 30 or higher) on any exposed skin. · See a dentist one or two times a year for checkups and to have your teeth cleaned. · Wear a seat belt in the car. When should you call for help? Watch closely for changes in your health, and be sure to contact your doctor if you have any problems or symptoms that concern you. Where can you learn more? Go to [email or web address]://noemi.[email or web address] [email or web address] org and sign in to your Active [employer] account. Enter P072 in the Be my eyes box to learn more about \"Well Visit, Ages 25 to 48: Care Instructions. \"     If you do not have an account, please click on the \"Sign Up Now\" link. Current as of: May 27, 2020               Content Version: 12.9  © 0499-8633 [employer]wise[employer]. Care instructions adapted under license by [facility] ([facility]). If you have questions about a medical condition or this instruction, always ask your healthcare professional. [place] [address] any warranty or liability for your use of this information.

## 2021-06-24 ENCOUNTER — TELEPHONE (OUTPATIENT)
Dept: FAMILY MEDICINE CLINIC | Age: 46
End: 2021-06-24

## 2021-11-08 ENCOUNTER — OFFICE VISIT (OUTPATIENT)
Dept: FAMILY MEDICINE CLINIC | Age: 46
End: 2021-11-08
Payer: COMMERCIAL

## 2021-11-08 ENCOUNTER — TELEPHONE (OUTPATIENT)
Dept: FAMILY MEDICINE CLINIC | Age: 46
End: 2021-11-08

## 2021-11-08 VITALS
HEART RATE: 75 BPM | HEIGHT: 64 IN | BODY MASS INDEX: 24.24 KG/M2 | DIASTOLIC BLOOD PRESSURE: 74 MMHG | SYSTOLIC BLOOD PRESSURE: 116 MMHG | TEMPERATURE: 98.2 F | RESPIRATION RATE: 14 BRPM | WEIGHT: 142 LBS

## 2021-11-08 DIAGNOSIS — F17.210 CIGARETTE NICOTINE DEPENDENCE WITHOUT COMPLICATION: Chronic | ICD-10-CM

## 2021-11-08 DIAGNOSIS — K04.7 DENTAL INFECTION: Primary | ICD-10-CM

## 2021-11-08 PROCEDURE — 99213 OFFICE O/P EST LOW 20 MIN: CPT | Performed by: FAMILY MEDICINE

## 2021-11-08 RX ORDER — AMOXICILLIN AND CLAVULANATE POTASSIUM 875; 125 MG/1; MG/1
1 TABLET, FILM COATED ORAL 2 TIMES DAILY
Qty: 20 TABLET | Refills: 0 | Status: SHIPPED | OUTPATIENT
Start: 2021-11-08 | End: 2021-11-18

## 2021-11-08 NOTE — PROGRESS NOTES
Chief Complaint   Patient presents with    Dental Pain     top left wisdom tooth. started 2 days ago       History obtained from the patient. SUBJECTIVE:  Edward Ching is a 51 y.o. male that presents today for     -Tooth pain:  L upper wisdome tooth  Is infected  Will be getting pulled soon  Dentist out, so asking me for ATB  No fevers that he's aware of.       -Smoking:  Work in progress  Taking wellbutrin, is helping    Age/Gender Health Maintenance    Lipid -   Lab Results   Component Value Date    CHOL 97 (L) 01/10/2020     Lab Results   Component Value Date    TRIG 42 01/10/2020     Lab Results   Component Value Date    HDL 62 01/10/2020     Lab Results   Component Value Date    LDLCALC 27 01/10/2020     No results found for: LABVLDL, VLDL  No results found for: CHOLHDLRATIO    DM Screen -   Lab Results   Component Value Date    GLUCOSE 97 01/10/2020       Colon Cancer Screening - Colon polyp with [facility], FEB 2020, repeat 5 years. Lung Cancer Screening (Age 54 to [de-identified] with 27 pack year hx, current smoker or quit within past 13 years) - age 53    Tetanus - UTD June 2021  Influenza Vaccine - declines NOV 2021  Pneumonia Vaccine - declines FEB 2020/JUNE 2021  Zoster - age 47     PSA testing discussion - age 53  AAA Screening - age 71    Falls screening - n/a      Current Outpatient Medications   Medication Sig Dispense Refill    amoxicillin-clavulanate (AUGMENTIN) 875-125 MG per tablet Take 1 tablet by mouth 2 times daily for 10 days 20 tablet 0    buPROPion (WELLBUTRIN SR) 150 MG extended release tablet Take 1 tablet by mouth daily for 3 days, THEN 1 tablet 2 times daily. X 3 months. . 177 tablet 0    omeprazole (PRILOSEC) 20 MG delayed release capsule Take 1 capsule by mouth 2 times daily (before meals) 60 capsule 5     No current facility-administered medications for this visit.      Orders Placed This Encounter   Medications    amoxicillin-clavulanate (AUGMENTIN) 875-125 MG per tablet     Sig: Take 1 tablet by mouth 2 times daily for 10 days     Dispense:  20 tablet     Refill:  0         All medications reviewed and reconciled, including OTC and herbal medications. Updated list given to patient. Patient Active Problem List    Diagnosis Date Noted    Dyspepsia 01/10/2020    Family history of colon cancer, Maternal grandfather at ~ age 54     History of shingles     Nicotine dependence        Past Medical History:   Diagnosis Date    Family history of colon cancer, Maternal grandfather at ~ age 54     Hernia, inguinal     History of shingles     Kidney stone     Nicotine dependence        Past Surgical History:   Procedure Laterality Date    ADENOIDECTOMY      HERNIA REPAIR      KIDNEY STONE SURGERY      LITHOTRIPSY      MYRINGOTOMY AND TYMPANOSTOMY TUBE PLACEMENT      VASECTOMY N/A 09/28/2007       No Known Allergies      Social History     Tobacco Use    Smoking status: Current Every Day Smoker     Packs/day: 0.50     Years: 30.00     Pack years: 15.00     Types: Cigarettes     Start date: 1/10/1990    Smokeless tobacco: Never Used   Substance Use Topics    Alcohol use: Yes     Alcohol/week: 21.0 standard drinks     Types: 21 Cans of beer per week     Comment: 2-3 beers /day         Family History   Problem Relation Age of Onset    Colon Cancer Maternal Grandfather 54    Prostate Cancer Paternal Uncle 61    Prostate Cancer Paternal Uncle 61    Heart Attack Father          I have reviewed the patient's past medical history, past surgical history, allergies, medications, social and family history and I have made updates where appropriate.       Review of Systems  Positive responses are highlighted in bold    Constitutional:  Fever, Chills, Night Sweats, Fatigue, Unexpected changes in weight  HENT:  Ear pain, Tinnitus, Nosebleeds, Trouble swallowing, Hearing loss, Sore throat  Cardiovascular:  Chest Pain, Palpitations, Orthopnea, Paroxysmal Nocturnal Dyspnea  Respiratory:  Cough, Wheezing, Shortness of breath, Chest tightness, Apnea  Gastrointestinal:  Nausea, Vomiting, Diarrhea, Constipation, Heartburn, Blood in stool  Genitourinary:  Difficulty or painful urination, Flank pain, Change in frequency, Urgency  Skin:  Color change, Rash, Itching, Wound  Musculoskeletal:  Joint pain, Back pain, Gait problems, Joint swelling, Myalgias  Neurological:  Dizziness, Headaches, Presyncope, Numbness, Seizures, Tremors  Endocrine:  Heat Intolerance, Cold Intolerance, Polydipsia, Polyphagia, Polyuria      PHYSICAL EXAM:  Vitals:    11/08/21 1611   BP: 116/74   Pulse: 75   Resp: 14   Temp: 98.2 °F (36.8 °C)   TempSrc: Oral   Weight: 142 lb (64.4 kg)   Height: 5' 4\" (1.626 m)     Body mass index is 24.37 kg/m². Pain Score:   4 (teeth)    VS Reviewed  General Appearance: A&O x 3, No acute distress,well developed and well- nourished  Eyes: pupils equal, round, and reactive to light, extraocular eye movements intact, conjunctivae and eye lids without erythema  ENT: external ear and ear canal clear bilaterally, TMs intact and regular, nose without deformity, nasal mucosa and turbinates normal without polyps, oropharynx normal, dentition is in disrepair with swelling and redness around L upper wisdom tooth  Neck: supple and non-tender without mass, no thyromegaly or thyroid nodules, no cervical lymphadenopathy  Pulmonary/Chest: clear to auscultation bilaterally- no wheezes, rales or rhonchi, normal air movement, no respiratory distress or retractions  Cardiovascular: S1 and S2 auscultated w/ RRR. No murmurs, rubs, clicks, or gallops, distal pulses intact. Abdomen: soft, non-tender, non-distended, bowel sounds physiologic,  no rebound or guarding, no masses or hernias noted. Liver and spleen without enlargement. Extremities: no cyanosis, clubbing or edema of the lower extremities. Skin: warm and dry, no rash or erythema      ASSESSMENT & PLAN  1.  Dental infection    augmentin

## 2021-11-08 NOTE — TELEPHONE ENCOUNTER
Future Appointments   Date Time Provider Isai Duron   11/8/2021  4:20 PM Jason Worrell DO [facility]   6/20/2022  8:00 AM Jose SUMNER [address] [place] [address]

## 2021-11-08 NOTE — TELEPHONE ENCOUNTER
----- Message from Adam Reynolds sent at 11/8/2021  1:05 PM EST -----  Subject: Message to Provider    QUESTIONS  Information for Provider? Patient called after being referred by his   Dentist Dr. Meagan Spring to get an antibiotic called in by PCP Dr. Sade Hatch Patient is unable to get in to office and his having sharp   pain - no swelling - sore to touch, Top left wisdom tooth. Uses [facility]   on [address] Patient is willing to any type of visit w/ any provider in   practice. Please contact   ---------------------------------------------------------------------------  --------------  CALL BACK INFO  What is the best way for the office to contact you? OK to leave message on   voicemail  Preferred Call Back Phone Number? [phone]  ---------------------------------------------------------------------------  --------------  SCRIPT ANSWERS  Relationship to Patient?  Self